# Patient Record
(demographics unavailable — no encounter records)

---

## 2025-02-13 NOTE — ASSESSMENT
[FreeTextEntry1] : Ezekiel Florence is a 38 yo M with prediabetes presenting today for CPE to establish care  #Ganglion cyst - Noted fleshy cyst over dorsal right index finger c/w ganglion cyst - Developed in the context of working out, weights rubbing against it - Discussed the possible chronicity of it as well as possibility of spontaneous rupture and recurrence - Will refer to ortho hand and patient said he will pursue if it gets worse   #HCM - Anxiety/Depression Screening: PHQ2 negative - STI Screening (syphilis, chlamydia, gonorrhea, HBV, HCV, HIV): active with wife only, declines - Diabetes Mellitus: check a1c today - Hyperlipidemia Screening: Check today - Cardiovascular disease screening: check today - Lipoprotein(a) screening: Check today - Vaccinations: UTD, [employer] employee, COVID UTD, flu yearly, TDAP recently - Check CMP, CBC, a1c, lipids, lpa, hep c and HIV - RTC in 1 year or PRN depending on results of labwork   Case d/w Dr. Dany Trammell, PGY-2

## 2025-02-13 NOTE — PHYSICAL EXAM
[No Edema] : there was no peripheral edema [Soft] : abdomen soft [Non Tender] : non-tender [Non-distended] : non-distended [No Rash] : no rash [No Focal Deficits] : no focal deficits [Normal Gait] : normal gait [Normal] : affect was normal and insight and judgment were intact [de-identified] : cyst appreciated over proximal dorsal right 2nd digit, mobile, seems fluid filled

## 2025-02-13 NOTE — HISTORY OF PRESENT ILLNESS
[FreeTextEntry1] : CPE [de-identified] : Ezekiel Florence is a 36 yo M with prediabetes presenting today for CPE to establish care. Hasn't seen a doctor since prior to COVID. Had prediabetes in past, as high as A1c 5.9%. Does mention new cyst on right index finger. 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 Never smoker, alcohol 1-2 drinks out on weekend, no drug use. Does take creatine. Tends to eat healthy but does stray. Activity includes weight training 4 days/wk, not much cardio. Lives at home with wife and daughter, mom over 5 days a week to help with daughter ROS: See HPI, otherwise negative.   Health Maintenance: - Anxiety/Depression Screening: PHQ2 negative  - STI Screening (syphilis, chlamydia, gonorrhea, HBV, HCV, HIV): active with wife only, declines - Diabetes Mellitus: check a1c today  - Hyperlipidemia Screening: Check today - Cardiovascular disease screening: check today - Lipoprotein(a) screening: Check today - Vaccinations: UTD, [employer] employee, COVID UTD, flu yearly, TDAP recently

## 2025-02-13 NOTE — PHYSICAL EXAM
[No Edema] : there was no peripheral edema [Soft] : abdomen soft [Non Tender] : non-tender [Non-distended] : non-distended [No Rash] : no rash [No Focal Deficits] : no focal deficits [Normal Gait] : normal gait [Normal] : affect was normal and insight and judgment were intact [de-identified] : cyst appreciated over proximal dorsal right 2nd digit, mobile, seems fluid filled

## 2025-02-13 NOTE — HISTORY OF PRESENT ILLNESS
[FreeTextEntry1] : CPE [de-identified] : Ezekiel Florence is a 36 yo M with prediabetes presenting today for CPE to establish care. Hasn't seen a doctor since prior to COVID. Had prediabetes in past, as high as A1c 5.9%. Does mention new cyst on right index finger. 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 Never smoker, alcohol 1-2 drinks out on weekend, no drug use. Does take creatine. Tends to eat healthy but does stray. Activity includes weight training 4 days/wk, not much cardio. Lives at home with wife and daughter, mom over 5 days a week to help with daughter ROS: See HPI, otherwise negative.   Health Maintenance: - Anxiety/Depression Screening: PHQ2 negative  - STI Screening (syphilis, chlamydia, gonorrhea, HBV, HCV, HIV): active with wife only, declines - Diabetes Mellitus: check a1c today  - Hyperlipidemia Screening: Check today - Cardiovascular disease screening: check today - Lipoprotein(a) screening: Check today - Vaccinations: UTD, [employer] employee, COVID UTD, flu yearly, TDAP recently

## 2025-06-03 NOTE — HISTORY OF PRESENT ILLNESS
[Moderate] : moderate [___ Days ago] : [unfilled] days ago [Episodic] : episodic [Diarrhea] : diarrhea [Abdominal Pain] : abdominal pain [Nausea] : no nausea [Vomiting] : no vomiting [FreeTextEntry8] : 38M PMHx pre-DM p/w diarrhea.   #Diarrhea  - Started 6 days ago  - A/w 3-4 episodes of diarrhea a day  - Denied fever, chills  - Initially had runny nose and cough 6 days ago, but resolved 1 day after  - Recent exposure to COVID the day prior to diarrhea symptoms  - Also w/ recent travel to [place] ([place]). Denies leaving resort or drinking tap water  - No unusual color or blood noted in stool  - Episodes occur with food intake  - Did not take OTC medications

## 2025-06-03 NOTE — END OF VISIT
[] : Resident [FreeTextEntry3] : unclear etiology. could be 2/2 COVID, but could be infectious with a hx of recent travel. stool culture.

## 2025-06-10 NOTE — HISTORY OF PRESENT ILLNESS
[Home] : at home, [unfilled] , at the time of the visit. [Medical Office: ([facility])___] : at the medical office located in  [Verbal consent obtained from patient] : the patient, [unfilled] [Telehealth (audio & video)] : This visit was provided via telehealth using real-time 2-way audio visual technology. [de-identified] : 38M pmhx pre-DM p/f f/u E.coli diarrhea Now resolved. Back to one soft BM/day, closer to baseline. Feels well, no abdominal pain, fever.

## 2025-06-10 NOTE — PHYSICAL EXAM
[No Acute Distress] : no acute distress [Well Nourished] : well nourished [Well Developed] : well developed [Well-Appearing] : well-appearing [Normal Affect] : the affect was normal [Normal Insight/Judgement] : insight and judgment were intact [Normal Gait] : normal gait